# Patient Record
Sex: FEMALE | Race: WHITE | NOT HISPANIC OR LATINO | URBAN - METROPOLITAN AREA
[De-identification: names, ages, dates, MRNs, and addresses within clinical notes are randomized per-mention and may not be internally consistent; named-entity substitution may affect disease eponyms.]

---

## 2023-07-27 NOTE — ASU PATIENT PROFILE, ADULT - NSICDXPASTSURGICALHX_GEN_ALL_CORE_FT
PAST SURGICAL HISTORY:  H/O hernia repair right groing 1968    H/O hernia repair abdominal  fallow up  from ovaries removal  in 2005, right abdominal side    H/O removal of cyst right [id number]    H/O tubal ligation 1978    History of benign skin tumor right thumb  in 2000 year    History of bunionectomy both feet  2017    History of laparoscopy with salpingectomy  bilaterally    Intrauterine polyp 2018

## 2023-07-27 NOTE — ASU PATIENT PROFILE, ADULT - NS PREOP UNDERSTANDS INFO
Spoke to patient to be NPo/NO solid foods after 2200 pm tonight. allow to drink water and apple juice till 12mn, dress comfortable, leave alll valuable at home, bring ID photo and insurance cards,  escort arranged, address and telephone  given to patient/yes

## 2023-07-27 NOTE — ASU PATIENT PROFILE, ADULT - NSICDXPASTMEDICALHX_GEN_ALL_CORE_FT
PAST MEDICAL HISTORY:  No pertinent past medical history      PAST MEDICAL HISTORY:  Hiatal hernia History    Osteopenia

## 2023-07-27 NOTE — ASU PATIENT PROFILE, ADULT - NS PREOP MEDICATION GIVEN
Patient cancelled appointment on 11/15/19 with Dr Johanna Padilla month f/u-pt may cancel PFT test depending on the cost of copay  . Reason: due to copay/insurance    Patient did not reschedule appointment. Patient declined to r/s    ASSESSMENT AND PLAN: 8/2/19  Abnormal PFT/Emphysema   An isolated impairment diffusion capacity can be seen in several entities including early emphysema, interstitial lung disease or pulmonary vascular disease. I favor early emphysema based upon the patient's smoking hx.   - PFTs show worsening DLCO  -  albuterol prn     SOB (shortness of breath)    Based on the information available thus far, I favor a diagnosis of early emphysema, deconditioning and CAD. PFTs are worsened and now she has some restriction. Restrictive lung disease may be secondary to weight gain or alternatively interstitial lung disease.     - check PFTs again prior to f/u  - consider chest HRCT if worse  - encouraged regular aerobic exercise  - Discussed diet and exercise again  - continue use albuterol as needed. yes

## 2023-07-28 ENCOUNTER — OUTPATIENT (OUTPATIENT)
Dept: OUTPATIENT SERVICES | Facility: HOSPITAL | Age: 78
LOS: 1 days | Discharge: HOME | End: 2023-07-28
Payer: MEDICARE

## 2023-07-28 VITALS
DIASTOLIC BLOOD PRESSURE: 74 MMHG | RESPIRATION RATE: 18 BRPM | TEMPERATURE: 97 F | OXYGEN SATURATION: 96 % | SYSTOLIC BLOOD PRESSURE: 133 MMHG | HEART RATE: 59 BPM

## 2023-07-28 VITALS
OXYGEN SATURATION: 98 % | WEIGHT: 122.36 LBS | TEMPERATURE: 98 F | RESPIRATION RATE: 16 BRPM | SYSTOLIC BLOOD PRESSURE: 119 MMHG | HEART RATE: 62 BPM | DIASTOLIC BLOOD PRESSURE: 83 MMHG | HEIGHT: 65 IN

## 2023-07-28 DIAGNOSIS — Z98.890 OTHER SPECIFIED POSTPROCEDURAL STATES: Chronic | ICD-10-CM

## 2023-07-28 DIAGNOSIS — Z86.018 PERSONAL HISTORY OF OTHER BENIGN NEOPLASM: Chronic | ICD-10-CM

## 2023-07-28 DIAGNOSIS — Z98.51 TUBAL LIGATION STATUS: Chronic | ICD-10-CM

## 2023-07-28 DIAGNOSIS — N84.0 POLYP OF CORPUS UTERI: Chronic | ICD-10-CM

## 2023-07-28 PROCEDURE — 88304 TISSUE EXAM BY PATHOLOGIST: CPT | Mod: 26

## 2023-07-28 PROCEDURE — ZZZZZ: CPT

## 2023-07-28 RX ORDER — MOMETASONE FUROATE 50 UG/1
2 SPRAY NASAL
Refills: 0 | DISCHARGE

## 2023-07-28 RX ORDER — PROGESTERONE 200 MG/1
2 CAPSULE, LIQUID FILLED ORAL
Refills: 0 | DISCHARGE

## 2023-07-28 RX ORDER — SODIUM CHLORIDE 9 MG/ML
500 INJECTION, SOLUTION INTRAVENOUS
Refills: 0 | Status: DISCONTINUED | OUTPATIENT
Start: 2023-07-28 | End: 2023-07-28

## 2023-07-28 NOTE — BRIEF OPERATIVE NOTE - NSICDXBRIEFPROCEDURE_GEN_ALL_CORE_FT
PROCEDURES:  Excision, soft tissue tumor, hand, subfascial, less than 1.5 cm 28-Jul-2023 08:29:42 left middle finger Iwona Valladares

## 2023-07-28 NOTE — ASU DISCHARGE PLAN (ADULT/PEDIATRIC) - ASU DC SPECIAL INSTRUCTIONSFT
call to make follow up appt [phone] for 1 week. elevate left arm and keep sling dry and in place. Can shower but keep arm/sling dry. Take pain RX given by Dr. Alamo as needed and indicated. Non weightbearing left arm/hand.

## 2023-07-28 NOTE — ASU DISCHARGE PLAN (ADULT/PEDIATRIC) - CARE PROVIDER_API CALL
Raghavendra Alamo  Surgery of the Hand  [address], [address]  [place], [place] [address]  Phone: [phone]  Fax: [phone]  Follow Up Time:

## 2023-07-28 NOTE — BRIEF OPERATIVE NOTE - NSICDXBRIEFPREOP_GEN_ALL_CORE_FT
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

## 2023-07-28 NOTE — BRIEF OPERATIVE NOTE - NSICDXBRIEFPOSTOP_GEN_ALL_CORE_FT
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

## 2023-08-09 LAB — SURGICAL PATHOLOGY STUDY: SIGNIFICANT CHANGE UP

## 2023-11-21 NOTE — ASU DISCHARGE PLAN (ADULT/PEDIATRIC) - NPI NUMBER (FOR SYSADMIN USE ONLY) :
Price (Do Not Change): 0.00
Detail Level: Simple
Instructions: This plan will send the code FBSD to the PM system.  DO NOT or CHANGE the price.
[[id number]]

## (undated) DEVICE — MARKING PEN W RULER

## (undated) DEVICE — TOURNIQUET CUFF 18" DUAL PORT SINGLE BLADDER W PLC  (BLACK)

## (undated) DEVICE — GLV 8.5 PROTEXIS (WHITE)

## (undated) DEVICE — SLV COMPRESSION KNEE MED

## (undated) DEVICE — WARMING BLANKET LOWER ADULT

## (undated) DEVICE — DRSG XEROFORM 1 X 8"

## (undated) DEVICE — GLV 8 PROTEXIS (WHITE)

## (undated) DEVICE — SLING ARM CHIEFTAIN MESH LARGE

## (undated) DEVICE — PREP BETADINE SPONGE STICKS

## (undated) DEVICE — SUT ETHILON 5-0 18" P-3

## (undated) DEVICE — DRSG SPLINT FINGER PAD .75"X18"

## (undated) DEVICE — DRSG ACE BANDAGE 4"

## (undated) DEVICE — PACK HAND